# Patient Record
(demographics unavailable — no encounter records)

---

## 2024-10-25 NOTE — HISTORY OF PRESENT ILLNESS
[FreeTextEntry6] : Pt mom reports baby it's not eating regularly for the past week. Pt reports only drank 20 ounces of milk the last 2 days last wet diaper was 6 pm yesterday, slept through the night and drank  2 oz before coming to office  child is acting fine, playing and laughing per mom  afebrile, rectally checked in office no uri symtopms has been drooling, putting hands in mouth

## 2024-10-25 NOTE — DISCUSSION/SUMMARY
[FreeTextEntry1] : tylenol given in office, trial famotidine monitor wet diapers, continue to offer feeds monitor closely for dehydration, decreased wet diapers discussed with parents the possible need of IV hydration referral to cards

## 2024-10-29 NOTE — DISCUSSION/SUMMARY
[FreeTextEntry1] : child gained 4 oz in 5 days, mother still reports child is not interested in bottle feels the famotidine is helping a bit so will keep it on for now suppository given in office, will do abdominal massages, warm baths  referral to gi if symptoms do not improve

## 2024-10-29 NOTE — HISTORY OF PRESENT ILLNESS
[de-identified] : follow up from 10/24/24 [FreeTextEntry6] : breastmilk 20oz each day  5 wet diaper and 3 stool diapers  3 bms yesterday, but just a smear  no more formula  has been giving tylenol once and day, and has been giving famotidine  child is otherwirse happy, acting himself, no color changes when eating, no sweating with feeds

## 2024-11-14 NOTE — DISCUSSION/SUMMARY
[Normal Growth] : growth [Normal Development] : development  [No Elimination Concerns] : elimination [Continue Regimen] : feeding [No Skin Concerns] : skin [Normal Sleep Pattern] : sleep [None] : no medical problems [Anticipatory Guidance Given] : Anticipatory guidance addressed as per the history of present illness section [Family Functioning] : family functioning [Nutritional Adequacy and Growth] : nutritional adequacy and growth [Infant Development] : infant development [Oral Health] : oral health [Safety] : safety [Age Approp Vaccines] : Age appropriate vaccines administered [No Medications] : ~He/She~ is not on any medications [Parent/Guardian] : Parent/Guardian [] : The components of the vaccine(s) to be administered today are listed in the plan of care. The disease(s) for which the vaccine(s) are intended to prevent and the risks have been discussed with the caretaker.  The risks are also included in the appropriate vaccination information statements which have been provided to the patient's caregiver.  The caregiver has given consent to vaccinate. [FreeTextEntry1] : Recommend breastfeeding, 8-12 feedings per day. Mother should continue prenatal vitamins and avoid alcohol. If formula is needed, recommend iron-fortified formulations, 2-4 oz every 3-4 hrs. Cereal may be introduced using a spoon and bowl. When in car, patient should be in rear-facing car seat in back seat. Put baby to sleep on back, in own crib with no loose or soft bedding. Lower crib matress. Help baby to maintain sleep and feeding routines. May offer pacifier if needed. Continue tummy time when awake. seeing cards in december  f/u 2 months

## 2024-11-14 NOTE — PHYSICAL EXAM
[Alert] : alert [Normocephalic] : normocephalic [Flat Open Anterior [place]] : flat open anterior fontanelle [Red Reflex] : red reflex bilateral [PERRL] : PERRL [Normally Placed Ears] : normally placed ears [Auricles Well Formed] : auricles well formed [Clear Tympanic membranes] : clear tympanic membranes [Light reflex present] : light reflex present [Bony landmarks visible] : bony landmarks visible [Nares Patent] : nares patent [Palate Intact] : palate intact [Uvula Midline] : uvula midline [Symmetric Chest Rise] : symmetric chest rise [Clear to Auscultation Bilaterally] : clear to auscultation bilaterally [Regular Rate and Rhythm] : regular rate and rhythm [S1, S2 present] : S1, S2 present [+2 Femoral Pulses] : (+) 2 femoral pulses [Soft] : soft [Bowel Sounds] : bowel sounds present [Central Urethral Opening] : central urethral opening [Testicles Descended] : testicles descended bilaterally [Patent] : patent [Normally Placed] : normally placed [No Abnormal Lymph Nodes Palpated] : no abnormal lymph nodes palpated [Startle Reflex] : startle reflex present [Plantar Grasp] : plantar grasp reflex present [Symmetric [place]] : symmetric blanca [Acute Distress] : no acute distress [Discharge] : no discharge [Palpable Masses] : no palpable masses [Murmurs] : no murmurs [Tender] : nontender [Distended] : nondistended [Hepatomegaly] : no hepatomegaly [Splenomegaly] : no splenomegaly [Powell-Ortolani] : negative Powell-Ortolani [Allis Sign] : negative Allis sign [Spinal Dimple] : no spinal dimple [Tuft of Hair] : no tuft of hair [Rash or Lesions] : no rash/lesions

## 2024-11-14 NOTE — HISTORY OF PRESENT ILLNESS
[Breast milk] : breast milk [Formula ___ oz in 24hrs] : [unfilled] oz of formula in 24 hours [Normal] : Normal [___ voids per day] : [unfilled] voids per day [Frequency of stools: ___] : Frequency of stools: [unfilled]  stools [per day] : per day. [In Bassinet/Crib] : sleeps in bassinet/crib [On back] : sleeps on back [Sleeps 12-16 hours per 24 hours (including naps)] : sleeps 12-16 hours per 24 hours (including naps) [Pacifier use] : Pacifier use [Tummy time] : tummy time [No] : No cigarette smoke exposure [Rear facing car seat in back seat] : Rear facing car seat in back seat [Carbon Monoxide Detectors] : Carbon monoxide detectors at home [NO] : No [Fruits] : no fruits [Vegetables] : no vegetables [Cereal] : no cereal [Exposure to electronic nicotine delivery system] : No exposure to electronic nicotine delivery system

## 2024-12-18 NOTE — PHYSICAL EXAM
[General Appearance - Alert] : alert [General Appearance - In No Acute Distress] : in no acute distress [General Appearance - Well Nourished] : well nourished [General Appearance - Well Developed] : well developed [General Appearance - Well-Appearing] : well appearing [Appearance Of Head] : the head was normocephalic [Facies] : there were no dysmorphic facial features [Sclera] : the conjunctiva were normal [Outer Ear] : the ears and nose were normal in appearance [Examination Of The Oral Cavity] : mucous membranes were moist and pink [Auscultation Breath Sounds / Voice Sounds] : breath sounds clear to auscultation bilaterally [Pectus Excavatum] : a pectus excavatum was noted [Apical Impulse] : quiet precordium with normal apical impulse [Heart Rate And Rhythm] : normal heart rate and rhythm [Heart Sounds] : normal S1 and S2 [No Murmur] : no murmurs  [Heart Sounds Gallop] : no gallops [Heart Sounds Pericardial Friction Rub] : no pericardial rub [Edema] : no edema [Arterial Pulses] : normal upper and lower extremity pulses with no pulse delay [Heart Sounds Click] : no clicks [Capillary Refill Test] : normal capillary refill [Bowel Sounds] : normal bowel sounds [Abdomen Soft] : soft [Nondistended] : nondistended [Abdomen Tenderness] : non-tender [Nail Clubbing] : no clubbing  or cyanosis of the fingers [Cervical Lymph Nodes Enlarged Anterior] : The anterior cervical nodes were normal [Cervical Lymph Nodes Enlarged Posterior] : The posterior cervical nodes were normal [] : no rash [Skin Lesions] : no lesions [Skin Turgor] : normal turgor [Demonstrated Behavior - Infant Nonreactive To Parents] : interactive [Mood] : mood and affect were appropriate for age [Demonstrated Behavior] : normal behavior

## 2024-12-18 NOTE — REASON FOR VISIT
[Initial Consultation] : an initial consultation for [Father] : father [Pectus Excavatum] : pectus excavatum

## 2024-12-19 NOTE — HISTORY OF PRESENT ILLNESS
[FreeTextEntry1] : I had the opportunity to examine Merrill, a 5-month-old male infant referred for cardiac evaluation because of the pectus excavatum.  He was a product 4.  Pregnancy normal spontaneous vaginal delivery at [facility] with a birthweight of 7 pounds and 8 ounces.  He has been formula fed with Enfamil taking 5 ounces per feed 5 times per day.  He is accompanied by his father.  Both father and mother were working middle school and are in good health there is no family history of congenital or acquired heart disease or sudden cardiac death.  He is on no medications and has no known allergies.  He is reaching his milestones for age.

## 2024-12-19 NOTE — DISCUSSION/SUMMARY
[May participate in all age-appropriate activities] : [unfilled] May participate in all age-appropriate activities. [Needs SBE Prophylaxis] : [unfilled] does not need bacterial endocarditis prophylaxis [FreeTextEntry1] : refer to Dr. José Manuel Rodríguez for assessment; f/u p.r.n.

## 2024-12-19 NOTE — CONSULT LETTER
[Today's Date] : [unfilled] [Name] : Name: [unfilled] [] : : ~~ [Today's Date:] : [unfilled] [Consult - Single Provider] : Thank you very much for allowing me to participate in the care of this patient. If you have any questions, please do not hesitate to contact me. [Sincerely,] : Sincerely, [DrChelle  ___] : Dr. COREA [Dear  ___:] : Dear Dr. [unfilled]: [FreeTextEntry9] : 12/18/24 [FreeTextEntry4] : Ernst Marcos, CATHRYN, RN [FreeTextEntry5] : [address] [place] [FreeTextEntry6] : [place] [id number] [FreeTextEntry7] : [phone] [de-identified] : Kael Edge MD, FAAP, FACC, FAHA Chief Emeritus, Division of Pediatric Cardiology [facility] Professor, Department of Pediatrics, [facility] [FreeTextEntry1] : 12/18/24

## 2024-12-19 NOTE — CARDIOLOGY SUMMARY
[Today's Date] : [unfilled] [de-identified] : 12/18/24 [FreeTextEntry1] : Sinus rhythm, rate 148/min, QRS axis was 71 degrees, VT 0.10, QRS 0.26, QTc 0.44 seconds and is within normal limits for age. [de-identified] : 12/18/24 [FreeTextEntry2] : Summary:  1. Technically difficult examination due to suboptimal echocardiographic windows. 2. Patent foramen ovale with left to right shunt, normal variant. 3. Normal valvar function. 4. Normal left ventricular size, morphology and systolic function. 5. Normal right ventricular morphology with qualitatively normal size and systolic function. 6. No pericardial effusion.

## 2024-12-19 NOTE — REVIEW OF SYSTEMS
[Nl] : no feeding issues at this time. [___ Formula] : [unfilled] Formula  [Acting Fussy] : not acting ~L fussy [Fever] : no fever [Wgt Loss (___ Lbs)] : no recent weight loss [Pallor] : not pale [Discharge] : no discharge [Redness] : no redness [Nasal Discharge] : no nasal discharge [Nasal Stuffiness] : no nasal congestion [Stridor] : no stridor [Cyanosis] : no cyanosis [Edema] : no edema [Diaphoresis] : not diaphoretic [Tachypnea] : not tachypneic [Wheezing] : no wheezing [Cough] : no cough [Being A Poor Eater] : not a poor eater [Vomiting] : no vomiting [Diarrhea] : no diarrhea [Decrease In Appetite] : appetite not decreased [Fainting (Syncope)] : no fainting [Dec Consciousness] :  no decrease in consciousness [Seizure] : no seizures [Hypotonicity (Flaccid)] : not hypotonic [Refusal to Bear Wgt] : normal weight bearing [Puffy Hands/Feet] : no hand/feet puffiness [Rash] : no rash [Hemangioma] : no hemangioma [Jaundice] : no jaundice [Wound problems] : no wound problems [Bruising] : no tendency for easy bruising [Swollen Glands] : no lymphadenopathy [Enlarged [place]] : the fontanelle was not enlarged [Hoarse Cry] : no hoarse cry [Failure To Thrive] : no failure to thrive [Penis Circumcised] : not circumcised [Undescended Testes] : no undescended testicle [Ambiguous Genitals] : genitals not ambiguous [Dec Urine Output] : no oliguria

## 2024-12-19 NOTE — REVIEW OF SYSTEMS
[Nl] : no feeding issues at this time. [___ Formula] : [unfilled] Formula  [Acting Fussy] : not acting ~L fussy [Fever] : no fever [Wgt Loss (___ Lbs)] : no recent weight loss [Pallor] : not pale [Discharge] : no discharge [Redness] : no redness [Nasal Discharge] : no nasal discharge [Nasal Stuffiness] : no nasal congestion [Stridor] : no stridor [Cyanosis] : no cyanosis [Edema] : no edema [Diaphoresis] : not diaphoretic [Tachypnea] : not tachypneic [Wheezing] : no wheezing [Cough] : no cough [Being A Poor Eater] : not a poor eater [Vomiting] : no vomiting [Diarrhea] : no diarrhea [Decrease In Appetite] : appetite not decreased [Fainting (Syncope)] : no fainting [Dec Consciousness] :  no decrease in consciousness [Seizure] : no seizures [Hypotonicity (Flaccid)] : not hypotonic [Refusal to Bear Wgt] : normal weight bearing [Puffy Hands/Feet] : no hand/feet puffiness [Rash] : no rash [Hemangioma] : no hemangioma [Jaundice] : no jaundice [Wound problems] : no wound problems [Bruising] : no tendency for easy bruising [Swollen Glands] : no lymphadenopathy [Enlarged [place]] : the fontanelle was not enlarged [Hoarse Cry] : no hoarse cry [Failure To Thrive] : no failure to thrive [Penis Circumcised] : not circumcised [Undescended Testes] : no undescended testicle [Ambiguous Genitals] : genitals not ambiguous [Dec Urine Output] : no oliguria

## 2024-12-19 NOTE — CONSULT LETTER
[Today's Date] : [unfilled] [Name] : Name: [unfilled] [] : : ~~ [Today's Date:] : [unfilled] [Consult - Single Provider] : Thank you very much for allowing me to participate in the care of this patient. If you have any questions, please do not hesitate to contact me. [Sincerely,] : Sincerely, [DrChelle  ___] : Dr. COREA [Dear  ___:] : Dear Dr. [unfilled]: [FreeTextEntry9] : 12/18/24 [FreeTextEntry4] : Ernst Marcos, CATHRYN, RN [FreeTextEntry5] : [address] [place] [FreeTextEntry6] : [place] [id number] [FreeTextEntry7] : [phone] [FreeTextEntry1] : 12/18/24 [de-identified] : Kael Edge MD, FAAP, FACC, FAHA Chief Emeritus, Division of Pediatric Cardiology [facility] Professor, Department of Pediatrics, [facility]

## 2024-12-19 NOTE — CARDIOLOGY SUMMARY
[Today's Date] : [unfilled] [de-identified] : 12/18/24 [FreeTextEntry1] : Sinus rhythm, rate 148/min, QRS axis was 71 degrees, [place] 0.10, QRS 0.26, QTc 0.44 seconds and is within normal limits for age. [de-identified] : 12/18/24 [FreeTextEntry2] : Summary:  1. Technically difficult examination due to suboptimal echocardiographic windows. 2. Patent foramen ovale with left to right shunt, normal variant. 3. Normal valvar function. 4. Normal left ventricular size, morphology and systolic function. 5. Normal right ventricular morphology with qualitatively normal size and systolic function. 6. No pericardial effusion.

## 2024-12-19 NOTE — HISTORY OF PRESENT ILLNESS
[FreeTextEntry1] : I had the opportunity to examine Merrill, a 5-month-old male infant referred for cardiac evaluation because of the pectus excavatum.  He was a product 4.  Pregnancy normal spontaneous vaginal delivery at [facility] with a birthweight of 7 pounds and 8 ounces.  He has been formula fed with Enfamil taking 5 ounces per feed 5 times per day.  He is accompanied by his father.  Both father and mother were working middle school and are in good health there is no family history of congenital or acquired heart disease or sudden cardiac death.  He is on no medications and has no known allergies.  He is reaching his milestones for age.

## 2025-01-16 NOTE — PHYSICAL EXAM
[Alert] : alert [Normocephalic] : normocephalic [Flat Open Anterior Flom] : flat open anterior fontanelle [Red Reflex] : red reflex bilateral [PERRL] : PERRL [Normally Placed Ears] : normally placed ears [Auricles Well Formed] : auricles well formed [Clear Tympanic membranes] : clear tympanic membranes [Light reflex present] : light reflex present [Bony landmarks visible] : bony landmarks visible [Nares Patent] : nares patent [Palate Intact] : palate intact [Uvula Midline] : uvula midline [Supple, full passive range of motion] : supple, full passive range of motion [Symmetric Chest Rise] : symmetric chest rise [Clear to Auscultation Bilaterally] : clear to auscultation bilaterally [Regular Rate and Rhythm] : regular rate and rhythm [S1, S2 present] : S1, S2 present [+2 Femoral Pulses] : (+) 2 femoral pulses [Soft] : soft [Bowel Sounds] : bowel sounds present [Central Urethral Opening] : central urethral opening [Testicles Descended] : testicles descended bilaterally [Patent] : patent [Normally Placed] : normally placed [No Abnormal Lymph Nodes Palpated] : no abnormal lymph nodes palpated [Symmetric Buttocks Creases] : symmetric buttocks creases [Plantar Grasp] : plantar grasp reflex present [Cranial Nerves Grossly Intact] : cranial nerves grossly intact [Acute Distress] : no acute distress [Discharge] : no discharge [Tooth Eruption] : no tooth eruption [Palpable Masses] : no palpable masses [Murmurs] : no murmurs [Tender] : nontender [Distended] : nondistended [Hepatomegaly] : no hepatomegaly [Splenomegaly] : no splenomegaly [Powell-Ortolani] : negative Powell-Ortolani [Allis Sign] : negative Allis sign [Spinal Dimple] : no spinal dimple [Tuft of Hair] : no tuft of hair [Rash or Lesions] : no rash/lesions

## 2025-01-16 NOTE — DISCUSSION/SUMMARY
[Normal Growth] : growth [Normal Development] : development [None] : No medical problems [No Elimination Concerns] : elimination [No Feeding Concerns] : feeding [No Skin Concerns] : skin [Normal Sleep Pattern] : sleep [No Medications] : ~He/She~ is not on any medications [Parent/Guardian] : parent/guardian [] : The components of the vaccine(s) to be administered today are listed in the plan of care. The disease(s) for which the vaccine(s) are intended to prevent and the risks have been discussed with the caretaker.  The risks are also included in the appropriate vaccination information statements which have been provided to the patient's caregiver.  The caregiver has given consent to vaccinate. [Family Functioning] : family functioning [Nutrition and Feeding] : nutrition and feeding [Infant Development] : infant development [Oral Health] : oral health [Safety] : safety [FreeTextEntry1] : Recommend breastfeeding, 8-12 feedings per day. If formula is needed, 2-4 oz every 3-4 hrs. Introduce single-ingredient foods rich in iron, one at a time. Incorporate up to 4 oz of flourinated water daily in a sippy cup. When teeth erupt wipe daily with washcloth. When in car, patient should be in rear-facing car seat in back seat. Put baby to sleep on back, in own crib with no loose or soft bedding. Lower crib matress. Help baby to maintain sleep and feeding routines. May offer pacifier if needed. Continue tummy time when awake. Ensure home is safe since baby is now more mobile. Do not use infant walker. Read aloud to baby. dad concerned he is not interested in formula or solids. Choking sometimes.  f/u 1 month for weight check, and reevaluate if swallow study is needed   went to cards, was referred to surgery- needs to make appt

## 2025-01-16 NOTE — HISTORY OF PRESENT ILLNESS
[Father] : father [Formula ___ oz/feed] : [unfilled] oz of formula per feed [Hours between feeds ___] : Child is fed every [unfilled] hours [Fruits] : fruits [Vegetables] : vegetables [Cereal] : cereal [Egg] : egg [Dairy] : dairy [Normal] : Normal [___ voids per day] : [unfilled] voids per day [Frequency of stools: ___] : Frequency of stools: [unfilled]  stools [per day] : per day. [Green/brown] : green/brown [Loose] : loose consistency [In Bassinet/Crib] : sleeps in bassinet/crib [On back] : sleeps on back [Sleeps 12-16 hours per 24 hours (including naps)] : sleeps 12-16 hours per 24 hours (including naps) [Pacifier use] : Pacifier use [Tummy time] : tummy time [Screen time only for video chatting] : screen time only for video chatting [No] : No cigarette smoke exposure [Water heater temperature set at <120 degrees F] : Water heater temperature set at <120 degrees F [Rear facing car seat in back seat] : Rear facing car seat in back seat [Carbon Monoxide Detectors] : Carbon monoxide detectors at home [Smoke Detectors] : Smoke detectors at home. [NO] : No [Vitamins ___] : no vitamins [Meat] : no meat [Fish] : no fish [Peanut] : no peanut [Co-sleeping] : no co-sleeping [Loose bedding, pillow, toys, and/or bumpers in crib] : no loose bedding, pillow, toys, and/or bumpers in crib [Exposure to electronic nicotine delivery system] : No exposure to electronic nicotine delivery system

## 2025-01-16 NOTE — DEVELOPMENTAL MILESTONES
[Pats or smiles at reflection] : pats or smiles at reflection [Begins to turn when name called] : begins to turn when name called [Babbles] : babbles [Rolls over prone to supine] : rolls over prone to supine [Sits briefly without support] : sits briefly without support [Reaches for object and transfers] : reaches for object and transfers [Rakes small object with 4 fingers] : rakes small object with 4 fingers [[place] small object on surface] : bangs small object on surface [Normal Development] : Normal Development [None] : none

## 2025-02-20 NOTE — HISTORY OF PRESENT ILLNESS
[FreeTextEntry6] : her for f/u weight check and eating baby is no longer having difficulty swallowing, no gagging went to surgery, needs to f/u in 5 years,

## 2025-02-20 NOTE — DISCUSSION/SUMMARY
[FreeTextEntry1] : weight increased will hold off on gi referral for now will follow recs of surgery at this time f/u 2 months

## 2025-04-24 NOTE — DISCUSSION/SUMMARY
[Normal Growth] : growth [Normal Development] : development [None] : No known medical problems [No Elimination Concerns] : elimination [No Feeding Concerns] : feeding [No Skin Concerns] : skin [Normal Sleep Pattern] : sleep [Family Adaptation] : family adaptation [Infant [place]] : infant independence [Feeding Routine] : feeding routine [Safety] : safety [No Medications] : ~He/She~ is not on any medications [Parent/Guardian] : parent/guardian [] : The components of the vaccine(s) to be administered today are listed in the plan of care. The disease(s) for which the vaccine(s) are intended to prevent and the risks have been discussed with the caretaker.  The risks are also included in the appropriate vaccination information statements which have been provided to the patient's caregiver.  The caregiver has given consent to vaccinate. [FreeTextEntry1] : Continue breastmilk or formula as desired. Increase table foods, 3 meals with 2-3 snacks per day. Incorporate up to 6 oz of flourinated water daily in a sippy cup. Discussed weaning of bottle and pacifier. Wipe teeth daily with washcloth. When in car, patient should be in rear-facing car seat in back seat. Put baby to sleep in own crib with no loose or soft bedding. Lower crib matress. Help baby to maintain consistent daily routines and sleep schedule. Recognize stranger anxiety. Ensure home is safe since baby is increasingly mobile. Be within arm's reach of baby at all times. Use consistent, positive discipline. Avoid screen time. Read aloud to baby.

## 2025-04-24 NOTE — PHYSICAL EXAM
[Alert] : alert [Normocephalic] : normocephalic [Flat Open Anterior [place]] : flat open anterior fontanelle [Red Reflex] : red reflex bilateral [PERRL] : PERRL [Normally Placed Ears] : normally placed ears [Auricles Well Formed] : auricles well formed [Clear Tympanic membranes] : clear tympanic membranes [Light reflex present] : light reflex present [Bony landmarks visible] : bony landmarks visible [Nares Patent] : nares patent [Palate Intact] : palate intact [Uvula Midline] : uvula midline [Supple, full passive range of motion] : supple, full passive range of motion [Symmetric Chest Rise] : symmetric chest rise [Clear to Auscultation Bilaterally] : clear to auscultation bilaterally [Regular Rate and Rhythm] : regular rate and rhythm [S1, S2 present] : S1, S2 present [+2 Femoral Pulses] : (+) 2 femoral pulses [Soft] : soft [Bowel Sounds] : bowel sounds present [Central Urethral Opening] : central urethral opening [Testicles Descended] : testicles descended bilaterally [No Abnormal Lymph Nodes Palpated] : no abnormal lymph nodes palpated [Symmetric Abduction and Rotation of hips] : symmetric abduction and rotation of hips [Straight] : straight [Cranial Nerves Grossly Intact] : cranial nerves grossly intact [Acute Distress] : no acute distress [Excessive Tearing] : no excessive tearing [Discharge] : no discharge [Palpable Masses] : no palpable masses [Murmurs] : no murmurs [Tender] : nontender [Distended] : nondistended [Hepatomegaly] : no hepatomegaly [Splenomegaly] : no splenomegaly [Allis Sign] : negative Allis sign [Rash or Lesions] : no rash/lesions

## 2025-04-24 NOTE — HISTORY OF PRESENT ILLNESS
[Mother] : mother [Formula ___ oz/feed] : [unfilled] oz of formula per feed [Fruit] : fruit [Vegetables] : vegetables [Cereal] : cereal [Meat] : meat [Eggs] : eggs [Fish] : fish [Dairy] : dairy [Finger foods] : finger foods [Water] : water [Normal] : Normal [___ voids per day] : [unfilled] voids per day [Frequency of stools: ___] : Frequency of stools: [unfilled]  stools [Firm] : firm consistency [In Crib] : sleeps in crib [Sleeps 12-16 hours per 24 hours (including naps)] : sleeps 12-16 hours per 24 hours (including naps) [Pacifier use] : Pacifier use [Sippy Cup use] : sippy cup use [Brushing teeth] : brushing teeth [Toothpaste] : Primary Fluoride Source: Toothpaste [Screen time only for video chatting] : screen time only for video chatting [No] : Not at  exposure [Water heater temperature set at <120 degrees F] : Water heater temperature set at <120 degrees F [Rear facing car seat in  back seat] : Rear facing car seat in  back seat [Carbon Monoxide Detectors] : Carbon monoxide detectors [Smoke Detectors] : Smoke detectors [Up to date] : Up to date [NO] : No [Peanut] : no peanut [Baby food] : no baby food [On back] : does not sleep on back [Co-sleeping] : no co-sleeping [Wakes up at night] : does not wake up at night [Bottle in bed] : not using bottle in bed [de-identified] : HEPB

## 2025-04-24 NOTE — DEVELOPMENTAL MILESTONES
[Says "Miky" or "Mama"] : says "Miky" or "Mama" nonspecifically [Sits well without support] : sits well without support [Transitions between sitting and lying] : transitions between sitting and lying [Crawls] : crawls [Picks up small objects with 3 fingers] : picks up small objects with 3 fingers and thumb [Releases objects intentionally] : releases objects intentionally [[place] objects together] : bangs objects together [Normal Development] : Normal Development [None] : none [Yes] : Completed. [Uses basic gestures] : does not use basic gestures [Balances on hands and knees] : does not balance on hands and knees

## 2025-07-15 NOTE — DEVELOPMENTAL MILESTONES
[Normal Development] : Normal Development [None] : none [Looks for hidden objects] : looks for hidden objects [Imitates new gestures] : imitates new gestures [Says "Dad" or "Mom" with meaning] : says "Dad" or "Mom" with meaning [Uses one word other than Mom or] : uses one word other than Mom or Dad or personal names [Follows a verbal command that] : follows a verbal command that includes a gesture [Drops object in a cup] : drops object in a cup [Picks up small object with 2 finger] : picks up small object with 2 finger pincer grasp [Picks up food and eats it] : picks up food and eats it [Takes first independent] : does not take first independent steps [Stands without support] : does not stand without support

## 2025-07-15 NOTE — PHYSICAL EXAM
[Alert] : alert [Normocephalic] : normocephalic [Closed Anterior [place]] : closed anterior fontanelle [Red Reflex] : red reflex bilateral [PERRL] : PERRL [Normally Placed Ears] : normally placed ears [Auricles Well Formed] : auricles well formed [Clear Tympanic membranes] : clear tympanic membranes [Light reflex present] : light reflex present [Bony landmarks visible] : bony landmarks visible [Nares Patent] : nares patent [Palate Intact] : palate intact [Uvula Midline] : uvula midline [Tooth Eruption] : tooth eruption [Supple, full passive range of motion] : supple, full passive range of motion [Symmetric Chest Rise] : symmetric chest rise [Clear to Auscultation Bilaterally] : clear to auscultation bilaterally [Regular Rate and Rhythm] : regular rate and rhythm [S1, S2 present] : S1, S2 present [+2 Femoral Pulses] : (+) 2 femoral pulses [Soft] : soft [Bowel Sounds] : normoactive bowel sounds [Central Urethral Opening] : central urethral opening [Testicles Descended] : testicles descended bilaterally [No Abnormal Lymph Nodes Palpated] : no abnormal lymph nodes palpated [Symmetric Abduction and Rotation of Hips] : symmetric abduction and rotation of hips [Straight] : straight [Cranial Nerves Grossly Intact] : cranial nerves grossly intact [Discharge] : no discharge [Palpable Masses] : no palpable masses [Murmurs] : no murmurs [Tender] : nontender [Distended] : nondistended [Hepatomegaly] : no hepatomegaly [Splenomegaly] : no splenomegaly [Allis Sign] : negative Allis sign [Rash or Lesions] : no rash/lesions

## 2025-07-15 NOTE — HISTORY OF PRESENT ILLNESS
[Mother] : mother [Formula ___ oz/feed] : [unfilled] oz of formula per feed [Fruit] : fruit [Vegetables] : vegetables [Meat] : meat [Dairy] : dairy [Baby food] : baby food [Finger food] : finger food [Table food] : table food [___ stools per day] : [unfilled]  stools per day [Normal] : Normal [On back] : On back [In crib] : In crib [Wakes up at night] : Wakes up at night [Pacifier use] : Pacifier use [Sippy cup use] : Sippy cup use [Brushing teeth] : Brushing teeth [Bottle in bed] : Bottle in bed [Toothpaste] : Primary Fluoride Source: Toothpaste [Playtime] : Playtime  [No] : Not at  exposure [Water heater temperature set at <120 degrees F] : Water heater temperature set at <120 degrees F [Car seat in back seat] : Car seat in back seat [Smoke Detectors] : Smoke detectors [Carbon Monoxide Detectors] : Carbon monoxide detectors [NO] : No [Up to date] : Up to date [Exposure to electronic nicotine delivery system] : No exposure to electronic nicotine delivery system [At risk for exposure to TB] : Not at risk for exposure to Tuberculosis
